# Patient Record
Sex: FEMALE | Race: OTHER | URBAN - METROPOLITAN AREA
[De-identification: names, ages, dates, MRNs, and addresses within clinical notes are randomized per-mention and may not be internally consistent; named-entity substitution may affect disease eponyms.]

---

## 2020-02-25 ENCOUNTER — OFFICE VISIT (OUTPATIENT)
Dept: OCCUPATIONAL MEDICINE | Age: 20
End: 2020-02-25
Attending: PHYSICIAN ASSISTANT

## 2021-01-05 ENCOUNTER — IMMUNIZATION (OUTPATIENT)
Dept: LAB | Facility: HOSPITAL | Age: 21
End: 2021-01-05
Attending: PREVENTIVE MEDICINE

## 2021-01-05 DIAGNOSIS — Z23 NEED FOR VACCINATION: ICD-10-CM

## 2021-01-05 PROCEDURE — 0011A SARSCOV2 VAC 100MCG/0.5ML IM: CPT

## 2021-02-02 ENCOUNTER — IMMUNIZATION (OUTPATIENT)
Dept: LAB | Facility: HOSPITAL | Age: 21
End: 2021-02-02
Attending: PREVENTIVE MEDICINE

## 2021-02-02 DIAGNOSIS — Z23 NEED FOR VACCINATION: Primary | ICD-10-CM

## 2021-02-02 PROCEDURE — 0012A SARSCOV2 VAC 100MCG/0.5ML IM: CPT

## 2021-03-23 ENCOUNTER — EMPLOYEE HEALTH (OUTPATIENT)
Dept: OTHER | Facility: HOSPITAL | Age: 21
End: 2021-03-23
Attending: PREVENTIVE MEDICINE

## 2021-03-23 DIAGNOSIS — Z01.84 IMMUNITY STATUS TESTING: Primary | ICD-10-CM

## 2021-03-23 LAB
HBV SURFACE AB SER QL: REACTIVE
HBV SURFACE AB SERPL IA-ACNC: 821.97 MIU/ML

## 2021-03-23 PROCEDURE — 86706 HEP B SURFACE ANTIBODY: CPT

## 2022-01-09 ENCOUNTER — IMMUNIZATION (OUTPATIENT)
Dept: LAB | Facility: HOSPITAL | Age: 22
End: 2022-01-09
Attending: EMERGENCY MEDICINE
Payer: COMMERCIAL

## 2022-01-09 DIAGNOSIS — Z23 NEED FOR VACCINATION: Primary | ICD-10-CM

## 2022-01-09 PROCEDURE — 0064A SARSCOV2 VAC 50MCG/0.25ML IM: CPT

## 2024-07-26 ENCOUNTER — HOSPITAL ENCOUNTER (EMERGENCY)
Facility: HOSPITAL | Age: 24
Discharge: HOME | End: 2024-07-26
Attending: EMERGENCY MEDICINE
Payer: OTHER MISCELLANEOUS

## 2024-07-26 VITALS
HEART RATE: 98 BPM | WEIGHT: 116 LBS | OXYGEN SATURATION: 100 % | TEMPERATURE: 98 F | HEIGHT: 63 IN | DIASTOLIC BLOOD PRESSURE: 74 MMHG | SYSTOLIC BLOOD PRESSURE: 116 MMHG | BODY MASS INDEX: 20.55 KG/M2 | RESPIRATION RATE: 16 BRPM
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
## 2024-07-26 LAB
HBV SURFACE AB SER QL: REACTIVE
HBV SURFACE AB SERPL IA-ACNC: >1000 MIU/ML
HCV AB SERPL QL IA: NONREACTIVE

## 2024-07-26 PROCEDURE — 99283 EMERGENCY DEPT VISIT LOW MDM: CPT

## 2024-07-26 PROCEDURE — 87389 HIV-1 AG W/HIV-1&-2 AB AG IA: CPT | Performed by: EMERGENCY MEDICINE

## 2024-07-26 PROCEDURE — 36415 COLL VENOUS BLD VENIPUNCTURE: CPT

## 2024-07-26 PROCEDURE — 86803 HEPATITIS C AB TEST: CPT | Performed by: EMERGENCY MEDICINE

## 2024-07-26 PROCEDURE — 86706 HEP B SURFACE ANTIBODY: CPT | Performed by: EMERGENCY MEDICINE

## 2024-07-26 NOTE — ED PROVIDER NOTES
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 No other symptoms or concerns were reported during the consultation.    Objective:   History reviewed. No pertinent past medical history.           History reviewed. No pertinent surgical history.             Social History     Socioeconomic History    Marital status: Single   Tobacco Use    Smoking status: Never    Smokeless tobacco: Never     Social Determinants of Health     Financial Resource Strain: Not on File (10/6/2022)    Received from [employer]    Financial Resource Strain     Financial Resource Strain: 0   Food Insecurity: Not on File (10/6/2022)    Received from [employer]    Food Insecurity     Food: 0   Transportation Needs: Not on File (10/6/2022)    Received from [employer]    Transportation Needs     Transportation: 0   Physical Activity: Not on File (10/6/2022)    Received from [employer]    Physical Activity     Physical Activity: 0   Stress: Not on File (10/6/2022)    Received from [employer]    Stress     Stress: 0   Social Connections: Not on File (10/6/2022)    Received from [employer]    Social Connections     Social Connections and Isolation: 0   Housing Stability: Not on File (10/6/2022)    Received from [employer]    Housing Stability     Housin              Review of Systems    Positive for stated complaint: Needle poke  Other systems are as noted in HPI.  Constitutional and vital signs reviewed.      All other systems reviewed and negative except as noted above.    Physical Exam     ED Triage Vitals [24 1618]   /74   Pulse 98   Resp 16   Temp 98.2 °F (36.8 °C)   Temp src Temporal   SpO2 100 %   O2 Device None (Room air)       Current:/74   Pulse 98   Temp 98.2 °F (36.8  °C) (Temporal)   Resp 16   Ht 160 cm (5' 3\")   Wt 52.6 kg   LMP 07/17/2024   SpO2 100%   BMI 20.55 kg/m²         General:  Vitals as listed.  No acute distress   Extremities:  needlestick on left index finger MCP.  Normal perfusion.    ED Course     Labs Reviewed   HCV ANTIBODY - Normal   HIV AG AB COMBO - Normal   EXPOSED INDIVIDUAL/EMPLOYEE PANEL    Narrative:     The following orders were created for panel order EXPOSED INDIVIDUAL/EMPLOYEE PANEL.  Procedure                               Abnormality         Status                     ---------                               -----------         ------                     Hepatitis B Surface Anti...[[id number]]                      Final result               HCV Antibody[[id number]]                 Normal              Final result               HIV Ag/Ab Combo[[id number]]              Normal              Final result                 Please view results for these tests on the individual orders.   HEPATITIS B SURFACE ANTIBODY       ED COURSE and MDM       Source patient had blood drawn.  Results are not back yet.  Patient does not meet criteria for antiretroviral treatment, unless patient is HIV positive.    Addendum: Source patient is HIV and hepatitis negative.    I have discussed with the patient the results of testing, differential diagnosis, and treatment plan. They expressed clear understanding of these instructions and agrees to the plan provided.    Disposition and Plan     Clinical Impression:  1. Needlestick injury due to hypodermic needle         Disposition:  Discharge  7/26/2024  5:19 pm    Follow-up:  [address] [address]  [phone]  Call in 3 day(s)          Medications Prescribed:  There are no discharge medications for this patient.